# Patient Record
Sex: FEMALE | Race: WHITE | URBAN - METROPOLITAN AREA
[De-identification: names, ages, dates, MRNs, and addresses within clinical notes are randomized per-mention and may not be internally consistent; named-entity substitution may affect disease eponyms.]

---

## 2020-11-18 ENCOUNTER — OFFICE VISIT (OUTPATIENT)
Dept: URBAN - METROPOLITAN AREA CLINIC 72 | Facility: CLINIC | Age: 55
End: 2020-11-18
Payer: COMMERCIAL

## 2020-11-18 DIAGNOSIS — H25.13 AGE-RELATED NUCLEAR CATARACT, BILATERAL: Primary | ICD-10-CM

## 2020-11-18 PROCEDURE — 99204 OFFICE O/P NEW MOD 45 MIN: CPT | Performed by: OPTOMETRIST

## 2020-11-18 ASSESSMENT — INTRAOCULAR PRESSURE
OS: 15
OD: 16

## 2020-11-18 NOTE — IMPRESSION/PLAN
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 Left. Plan: Discussed diagnosis, explained and understood. no treatment recommended at this time. Monitor.

## 2020-12-16 ENCOUNTER — OFFICE VISIT (OUTPATIENT)
Dept: URBAN - METROPOLITAN AREA CLINIC 72 | Facility: CLINIC | Age: 55
End: 2020-12-16
Payer: COMMERCIAL

## 2020-12-16 DIAGNOSIS — H52.03 HYPERMETROPIA, BILATERAL: ICD-10-CM

## 2020-12-16 DIAGNOSIS — H43.20 CRYSTALLINE DEPOSITS IN VITREOUS BODY, UNSPECIFIED EYE: ICD-10-CM

## 2020-12-16 DIAGNOSIS — H25.043 POSTERIOR SUBCAPSULAR POLAR AGE-RELATED CATARACT, BILATERAL: Primary | ICD-10-CM

## 2020-12-16 PROCEDURE — 92134 CPTRZ OPH DX IMG PST SGM RTA: CPT | Performed by: OPTOMETRIST

## 2020-12-16 PROCEDURE — 92012 INTRM OPH EXAM EST PATIENT: CPT | Performed by: OPTOMETRIST

## 2020-12-16 ASSESSMENT — VISUAL ACUITY
OS: 20/40
OD: 20/20

## 2020-12-16 ASSESSMENT — INTRAOCULAR PRESSURE
OD: 18
OS: 16

## 2020-12-16 NOTE — IMPRESSION/PLAN
Impression: Crystalline deposits in vitreous body, unspecified eye: H43.20. Left. Plan: Discussed diagnosis, explained and understood.

## 2020-12-16 NOTE — IMPRESSION/PLAN
Impression: Hypermetropia, bilateral: H52.03. Bilateral. Plan: A glasses Rx has been generated and dispensed. Pt to call with any concerns.

## 2020-12-16 NOTE — IMPRESSION/PLAN
Impression: Posterior subcapsular polar age-related cataract, bilateral: H25.043 Bilateral.
pt reports increase in prednisone in the last 5 to 6 weeks. vision affected OS > OD -
may improve with surgery. Plan: Cataracts account for the patient's complaints. Discussed all risks, benefits, procedures and recovery. Patient understands changing glasses will not improve vision. Patient desires to have surgery, recommend phacoemulsification with intraocular lens OS then OD.

## 2021-03-01 ENCOUNTER — SURGERY (OUTPATIENT)
Dept: URBAN - METROPOLITAN AREA SURGERY 44 | Facility: SURGERY | Age: 56
End: 2021-03-01
Payer: COMMERCIAL

## 2021-03-01 PROCEDURE — 66984 XCAPSL CTRC RMVL W/O ECP: CPT | Performed by: OPHTHALMOLOGY

## 2021-03-03 ENCOUNTER — PRE-OPERATIVE VISIT (OUTPATIENT)
Dept: URBAN - METROPOLITAN AREA CLINIC 71 | Facility: CLINIC | Age: 56
End: 2021-03-03
Payer: COMMERCIAL

## 2021-03-03 DIAGNOSIS — H25.813 COMBINED FORMS OF AGE-RELATED CATARACT, BILATERAL: Primary | ICD-10-CM

## 2021-03-03 DIAGNOSIS — H43.813 VITREOUS DEGENERATION, BILATERAL: ICD-10-CM

## 2021-03-03 PROCEDURE — 92136 OPHTHALMIC BIOMETRY: CPT | Performed by: OPHTHALMOLOGY

## 2021-03-03 PROCEDURE — 92014 COMPRE OPH EXAM EST PT 1/>: CPT | Performed by: OPHTHALMOLOGY

## 2021-03-03 ASSESSMENT — PACHYMETRY
OS: 3.55
OS: 23.76
OD: 3.50
OD: 23.77

## 2021-03-03 NOTE — IMPRESSION/PLAN
Impression: Combined forms of age-related cataract, bilateral: H25.813. Plan: OU: Discussed diagnosis in detail with patient. Discussed treatment options with patient. Discussed risks and benefits and patient understands. Advised patient of condition. Discussed signs and symptoms of retinal detachment. Discussed signs and symptoms of PVD/floaters. Discussed risks of progression. Surgical treatment is necessary to improve vision. Patient elects to have surgery. Surgical risks and benefits were discussed, explained and understood by patient. Lid scrubs and hygiene were explained. Patient instructed to use artificial tears as needed. Pre op instructions given and understood. Post op instructions given and understood. Educational materials provided: Cataract and Cataract extraction/lens. Central blurring on photos secondary to dense PSC. PROCEED WITH TRADITIONAL CATARACT SURGERY OS FIRST FOR DISTANCE THEN OD FOR NEAR WITH DEXYCU AND POSSIBLE RING. HOLD DIAMOX.

## 2021-03-04 RX ORDER — CIPROFLOXACIN HYDROCHLORIDE 3 MG/ML
0.3 % SOLUTION/ DROPS OPHTHALMIC
Qty: 5 | Refills: 1 | Status: INACTIVE
Start: 2021-03-04 | End: 2021-03-05

## 2021-03-08 ENCOUNTER — SURGERY (OUTPATIENT)
Dept: URBAN - METROPOLITAN AREA SURGERY 44 | Facility: SURGERY | Age: 56
End: 2021-03-08
Payer: COMMERCIAL

## 2021-03-08 ENCOUNTER — SURGERY (OUTPATIENT)
Dept: URBAN - METROPOLITAN AREA SURGERY 45 | Facility: SURGERY | Age: 56
End: 2021-03-08
Payer: COMMERCIAL

## 2021-03-08 PROCEDURE — 66984 XCAPSL CTRC RMVL W/O ECP: CPT | Performed by: OPHTHALMOLOGY

## 2021-03-09 ENCOUNTER — POST-OPERATIVE VISIT (OUTPATIENT)
Dept: URBAN - METROPOLITAN AREA CLINIC 72 | Facility: CLINIC | Age: 56
End: 2021-03-09
Payer: COMMERCIAL

## 2021-03-09 DIAGNOSIS — Z48.810 ENCOUNTER FOR SURGICAL AFTERCARE FOLLOWING SURGERY ON A SENSE ORGAN: Primary | ICD-10-CM

## 2021-03-09 PROCEDURE — 99024 POSTOP FOLLOW-UP VISIT: CPT | Performed by: OPTOMETRIST

## 2021-03-09 ASSESSMENT — INTRAOCULAR PRESSURE
OD: 17
OS: 14

## 2021-03-09 NOTE — IMPRESSION/PLAN
Impression: S/P Cataract Extraction by phacoemulsification with IOL placement OS - 1 Day. Encounter for surgical aftercare following surgery on a sense organ  Z48.810. Excellent post op course   Post operative instructions reviewed - Condition is improving - Plan: Discussed R/B/A to cataract SX, pt voices understands wishes to proceed. Ok t proceed with OD --Advised patient to use artificial tears for comfort.

## 2021-03-18 ENCOUNTER — POST-OPERATIVE VISIT (OUTPATIENT)
Dept: URBAN - METROPOLITAN AREA CLINIC 72 | Facility: CLINIC | Age: 56
End: 2021-03-18
Payer: COMMERCIAL

## 2021-03-18 PROCEDURE — 99024 POSTOP FOLLOW-UP VISIT: CPT | Performed by: OPTOMETRIST

## 2021-03-18 ASSESSMENT — INTRAOCULAR PRESSURE
OD: 17
OS: 16

## 2021-03-18 NOTE — IMPRESSION/PLAN
Impression: S/P Cataract Extraction by phacoemulsification with IOL placement OS - 10 Days. Encounter for surgical aftercare following surgery on a sense organ  Z48.810. Excellent post op course   Post operative instructions reviewed - Condition is improving - Plan: Ok, to proceed with cataract SX OD, discussed R/B/A to [address] with pt, pt voiced understanding and wishes to proceed. --Advised patient to use artificial tears for comfort.

## 2021-03-22 ENCOUNTER — SURGERY (OUTPATIENT)
Dept: URBAN - METROPOLITAN AREA SURGERY 45 | Facility: SURGERY | Age: 56
End: 2021-03-22
Payer: COMMERCIAL

## 2021-03-23 ENCOUNTER — POST-OPERATIVE VISIT (OUTPATIENT)
Dept: URBAN - METROPOLITAN AREA CLINIC 72 | Facility: CLINIC | Age: 56
End: 2021-03-23
Payer: COMMERCIAL

## 2021-03-23 PROCEDURE — 99024 POSTOP FOLLOW-UP VISIT: CPT | Performed by: OPTOMETRIST

## 2021-03-23 ASSESSMENT — INTRAOCULAR PRESSURE
OS: 15
OD: 18

## 2021-03-23 NOTE — IMPRESSION/PLAN
Impression: S/P Cataract Extraction by phacoemulsification with IOL placement OD - 1 Day. Presence of intraocular lens  Z96.1. Excellent post op course   Post operative instructions reviewed - Condition is improving - Plan: --Advised patient to use artificial tears for comfort.

## 2021-04-13 ENCOUNTER — POST-OPERATIVE VISIT (OUTPATIENT)
Dept: URBAN - METROPOLITAN AREA CLINIC 72 | Facility: CLINIC | Age: 56
End: 2021-04-13
Payer: COMMERCIAL

## 2021-04-13 DIAGNOSIS — Z96.1 PRESENCE OF INTRAOCULAR LENS: Primary | ICD-10-CM

## 2021-04-13 PROCEDURE — 99024 POSTOP FOLLOW-UP VISIT: CPT | Performed by: OPTOMETRIST

## 2021-04-13 ASSESSMENT — INTRAOCULAR PRESSURE
OS: 13
OD: 13

## 2021-04-13 ASSESSMENT — VISUAL ACUITY
OS: 20/20
OD: 20/20

## 2021-04-13 NOTE — IMPRESSION/PLAN
Impression: S/P Cataract Extraction by phacoemulsification with IOL placement OD - 22 Days. Presence of intraocular lens  Z96.1.  Excellent post op course   Post operative instructions reviewed - Condition is improving - Plan:

## 2021-10-12 ENCOUNTER — OFFICE VISIT (OUTPATIENT)
Dept: URBAN - METROPOLITAN AREA CLINIC 72 | Facility: CLINIC | Age: 56
End: 2021-10-12
Payer: COMMERCIAL

## 2021-10-12 DIAGNOSIS — H43.22 CRYSTALLINE DEPOSITS IN VITREOUS BODY, LEFT EYE: ICD-10-CM

## 2021-10-12 DIAGNOSIS — H26.493 OTHER SECONDARY CATARACT, BILATERAL: Primary | ICD-10-CM

## 2021-10-12 PROCEDURE — 92014 COMPRE OPH EXAM EST PT 1/>: CPT | Performed by: OPTOMETRIST

## 2021-10-12 ASSESSMENT — INTRAOCULAR PRESSURE
OD: 12
OS: 13

## 2021-10-12 NOTE — IMPRESSION/PLAN
Impression: Crystalline deposits in vitreous body, left eye: H43.22. Plan: Discussed diagnosis in detail with patient. Reassured patient of current condition and treatment. Will continue to observe condition and or symptoms. No treatment is required at this time.

## 2021-10-12 NOTE — IMPRESSION/PLAN
Impression: Other secondary cataract, bilateral: H26.493. OS>OD Plan: Discussed diagnosis in detail with patient. Discussed treatment options, YAG PC, with patient, though no treatment is required, or recommended at this time.  Will continue to monitor for changes

## 2022-10-13 ENCOUNTER — OFFICE VISIT (OUTPATIENT)
Dept: URBAN - METROPOLITAN AREA CLINIC 72 | Facility: CLINIC | Age: 57
End: 2022-10-13
Payer: COMMERCIAL

## 2022-10-13 DIAGNOSIS — M32.9 SYSTEMIC LUPUS ERYTHEMATOSUS: ICD-10-CM

## 2022-10-13 DIAGNOSIS — H26.493 OTHER SECONDARY CATARACT, BILATERAL: Primary | ICD-10-CM

## 2022-10-13 DIAGNOSIS — H43.22 CRYSTALLINE DEPOSITS IN VITREOUS BODY, LEFT EYE: ICD-10-CM

## 2022-10-13 DIAGNOSIS — H35.373 PUCKERING OF MACULA, BILATERAL: ICD-10-CM

## 2022-10-13 DIAGNOSIS — Z79.899 OTHER LONG TERM (CURRENT) DRUG THERAPY: ICD-10-CM

## 2022-10-13 DIAGNOSIS — H43.813 VITREOUS DEGENERATION, BILATERAL: ICD-10-CM

## 2022-10-13 PROCEDURE — 92014 COMPRE OPH EXAM EST PT 1/>: CPT | Performed by: OPTOMETRIST

## 2022-10-13 PROCEDURE — 92134 CPTRZ OPH DX IMG PST SGM RTA: CPT | Performed by: OPTOMETRIST

## 2022-10-13 ASSESSMENT — VISUAL ACUITY
OD: 20/20
OS: 20/20

## 2022-10-13 ASSESSMENT — INTRAOCULAR PRESSURE
OS: 16
OD: 15

## 2022-10-13 NOTE — IMPRESSION/PLAN
Impression: Other long term (current) drug therapy: Z79.899. Pt is on Plaquenil 1.5 pills PO QD Pt was on Plaquenil previously then was taken off. Pt just restarted medication about 2 months ago  Plan: Discussed diagnosis and today's findings with pt. Explained  to pt that it is uncommon, now a days, for toxicity to occur because of the dosages that are used, but the longer the medication has been taken the higher the risk. If toxicity is going to occur it is most likely to start showing signs around the ten year ross. No Maculopathy/toxicity seen on today's exam. Recommend yearly DFE and OCT MAC radial for monitoring Pt voiced understanding

## 2022-10-13 NOTE — IMPRESSION/PLAN
Impression: Crystalline deposits in vitreous body, left eye: H43.22. Ateroid VS synchysis Plan: Discussed DX in detail with pt No treatment is necessary at this time 
will monitor

## 2022-10-13 NOTE — IMPRESSION/PLAN
Impression: Puckering of macula, bilateral: H35.373. Plan: Discussed today's testing and findings with pt. Educated pt on Macular Pucker and discussed treatment options, including SX, though none is recommended or required at this time.  Will monitor for changes yearly with DFE and OCT

## 2023-11-21 ENCOUNTER — OFFICE VISIT (OUTPATIENT)
Dept: URBAN - METROPOLITAN AREA CLINIC 72 | Facility: CLINIC | Age: 58
End: 2023-11-21
Payer: COMMERCIAL

## 2023-11-21 DIAGNOSIS — H43.22 CRYSTALLINE DEPOSITS IN VITREOUS BODY, LEFT EYE: ICD-10-CM

## 2023-11-21 DIAGNOSIS — H35.342 MACULAR CYST, HOLE, OR PSEUDOHOLE, LEFT EYE: ICD-10-CM

## 2023-11-21 DIAGNOSIS — H43.813 VITREOUS DEGENERATION, BILATERAL: ICD-10-CM

## 2023-11-21 DIAGNOSIS — M32.9 SYSTEMIC LUPUS ERYTHEMATOSUS: ICD-10-CM

## 2023-11-21 DIAGNOSIS — Z79.899 OTHER LONG TERM (CURRENT) DRUG THERAPY: Primary | ICD-10-CM

## 2023-11-21 PROCEDURE — 92134 CPTRZ OPH DX IMG PST SGM RTA: CPT | Performed by: OPTOMETRIST

## 2023-11-21 PROCEDURE — 92014 COMPRE OPH EXAM EST PT 1/>: CPT | Performed by: OPTOMETRIST

## 2023-11-21 ASSESSMENT — INTRAOCULAR PRESSURE
OS: 18
OD: 18

## 2025-05-21 ENCOUNTER — OFFICE VISIT (OUTPATIENT)
Dept: URBAN - METROPOLITAN AREA CLINIC 72 | Facility: CLINIC | Age: 60
End: 2025-05-21
Payer: COMMERCIAL

## 2025-05-21 DIAGNOSIS — M32.9 SYSTEMIC LUPUS ERYTHEMATOSUS: ICD-10-CM

## 2025-05-21 DIAGNOSIS — Z79.899 OTHER LONG TERM (CURRENT) DRUG THERAPY: ICD-10-CM

## 2025-05-21 DIAGNOSIS — H43.22 CRYSTALLINE DEPOSITS IN VITREOUS BODY, LEFT EYE: ICD-10-CM

## 2025-05-21 DIAGNOSIS — H43.813 VITREOUS DEGENERATION, BILATERAL: ICD-10-CM

## 2025-05-21 DIAGNOSIS — H35.342 MACULAR CYST, HOLE, OR PSEUDOHOLE, LEFT EYE: Primary | ICD-10-CM

## 2025-05-21 DIAGNOSIS — Z96.1 PRESENCE OF INTRAOCULAR LENS: ICD-10-CM

## 2025-05-21 PROCEDURE — 92134 CPTRZ OPH DX IMG PST SGM RTA: CPT

## 2025-05-21 PROCEDURE — 99213 OFFICE O/P EST LOW 20 MIN: CPT

## 2025-05-21 ASSESSMENT — INTRAOCULAR PRESSURE
OS: 17
OD: 16